# Patient Record
Sex: MALE | Race: BLACK OR AFRICAN AMERICAN | Employment: OTHER | URBAN - METROPOLITAN AREA
[De-identification: names, ages, dates, MRNs, and addresses within clinical notes are randomized per-mention and may not be internally consistent; named-entity substitution may affect disease eponyms.]

---

## 2023-04-23 ENCOUNTER — HOSPITAL ENCOUNTER (EMERGENCY)
Age: 73
Discharge: PSYCHIATRIC OR SUBSTANCE USE FACILITY | End: 2023-04-25
Attending: EMERGENCY MEDICINE
Payer: MEDICARE

## 2023-04-23 DIAGNOSIS — E87.6 HYPOKALEMIA: ICD-10-CM

## 2023-04-23 DIAGNOSIS — E86.0 MODERATE DEHYDRATION: ICD-10-CM

## 2023-04-23 DIAGNOSIS — F23 ACUTE PSYCHOSIS (HCC): Primary | ICD-10-CM

## 2023-04-23 LAB
ALBUMIN SERPL-MCNC: 3.7 G/DL (ref 3.5–5)
ALBUMIN/GLOB SERPL: 1 (ref 1.1–2.2)
ALP SERPL-CCNC: 84 U/L (ref 45–117)
ALT SERPL-CCNC: 26 U/L (ref 12–78)
AMPHET UR QL SCN: NEGATIVE
ANION GAP SERPL CALC-SCNC: 3 MMOL/L (ref 5–15)
ANION GAP SERPL CALC-SCNC: 6 MMOL/L (ref 5–15)
APPEARANCE UR: CLEAR
AST SERPL W P-5'-P-CCNC: 35 U/L (ref 15–37)
BACTERIA URNS QL MICRO: NEGATIVE /HPF
BARBITURATES UR QL SCN: NEGATIVE
BASOPHILS # BLD: 0.1 K/UL (ref 0–0.1)
BASOPHILS NFR BLD: 1 % (ref 0–1)
BENZODIAZ UR QL: NEGATIVE
BILIRUB SERPL-MCNC: 0.6 MG/DL (ref 0.2–1)
BILIRUB UR QL: NEGATIVE
BUN SERPL-MCNC: 34 MG/DL (ref 6–20)
BUN SERPL-MCNC: 43 MG/DL (ref 6–20)
BUN/CREAT SERPL: 24 (ref 12–20)
BUN/CREAT SERPL: 27 (ref 12–20)
CA-I BLD-MCNC: 8.9 MG/DL (ref 8.5–10.1)
CA-I BLD-MCNC: 9.5 MG/DL (ref 8.5–10.1)
CANNABINOIDS UR QL SCN: NEGATIVE
CHLORIDE SERPL-SCNC: 107 MMOL/L (ref 97–108)
CHLORIDE SERPL-SCNC: 112 MMOL/L (ref 97–108)
CK SERPL-CCNC: 670 U/L (ref 39–308)
CK SERPL-CCNC: 953 U/L (ref 39–308)
CO2 SERPL-SCNC: 25 MMOL/L (ref 21–32)
CO2 SERPL-SCNC: 28 MMOL/L (ref 21–32)
COCAINE UR QL SCN: NEGATIVE
COLOR UR: ABNORMAL
CREAT SERPL-MCNC: 1.27 MG/DL (ref 0.7–1.3)
CREAT SERPL-MCNC: 1.82 MG/DL (ref 0.7–1.3)
DIFFERENTIAL METHOD BLD: ABNORMAL
DRUG SCRN COMMENT,DRGCM: NORMAL
EOSINOPHIL # BLD: 0.1 K/UL (ref 0–0.4)
EOSINOPHIL NFR BLD: 2 % (ref 0–7)
ERYTHROCYTE [DISTWIDTH] IN BLOOD BY AUTOMATED COUNT: 11.8 % (ref 11.5–14.5)
ETHANOL SERPL-MCNC: <10 MG/DL
FLUAV RNA SPEC QL NAA+PROBE: NOT DETECTED
FLUBV RNA SPEC QL NAA+PROBE: NOT DETECTED
GLOBULIN SER CALC-MCNC: 3.6 G/DL (ref 2–4)
GLUCOSE SERPL-MCNC: 157 MG/DL (ref 65–100)
GLUCOSE SERPL-MCNC: 95 MG/DL (ref 65–100)
GLUCOSE UR STRIP.AUTO-MCNC: NEGATIVE MG/DL
HCT VFR BLD AUTO: 43.7 % (ref 36.6–50.3)
HGB BLD-MCNC: 14.5 G/DL (ref 12.1–17)
HGB UR QL STRIP: NEGATIVE
HYALINE CASTS URNS QL MICRO: ABNORMAL /LPF (ref 0–5)
IMM GRANULOCYTES # BLD AUTO: 0 K/UL (ref 0–0.04)
IMM GRANULOCYTES NFR BLD AUTO: 0 % (ref 0–0.5)
KETONES UR QL STRIP.AUTO: NEGATIVE MG/DL
LEUKOCYTE ESTERASE UR QL STRIP.AUTO: NEGATIVE
LYMPHOCYTES # BLD: 0.6 K/UL (ref 0.8–3.5)
LYMPHOCYTES NFR BLD: 8 % (ref 12–49)
MAGNESIUM SERPL-MCNC: 2.5 MG/DL (ref 1.6–2.4)
MCH RBC QN AUTO: 30 PG (ref 26–34)
MCHC RBC AUTO-ENTMCNC: 33.2 G/DL (ref 30–36.5)
MCV RBC AUTO: 90.5 FL (ref 80–99)
METHADONE UR QL: NEGATIVE
MONOCYTES # BLD: 0.3 K/UL (ref 0–1)
MONOCYTES NFR BLD: 4 % (ref 5–13)
MUCOUS THREADS URNS QL MICRO: ABNORMAL /LPF
NEUTS SEG # BLD: 6.3 K/UL (ref 1.8–8)
NEUTS SEG NFR BLD: 85 % (ref 32–75)
NITRITE UR QL STRIP.AUTO: NEGATIVE
NRBC # BLD: 0 K/UL (ref 0–0.01)
NRBC BLD-RTO: 0 PER 100 WBC
OPIATES UR QL: NEGATIVE
PCP UR QL: NEGATIVE
PH UR STRIP: 5 (ref 5–8)
PLATELET # BLD AUTO: 265 K/UL (ref 150–400)
PMV BLD AUTO: 9.5 FL (ref 8.9–12.9)
POTASSIUM SERPL-SCNC: 2.6 MMOL/L (ref 3.5–5.1)
POTASSIUM SERPL-SCNC: 3.9 MMOL/L (ref 3.5–5.1)
PROT SERPL-MCNC: 7.3 G/DL (ref 6.4–8.2)
PROT UR STRIP-MCNC: 30 MG/DL
RBC # BLD AUTO: 4.83 M/UL (ref 4.1–5.7)
RBC #/AREA URNS HPF: ABNORMAL /HPF (ref 0–5)
SARS-COV-2 RNA RESP QL NAA+PROBE: NOT DETECTED
SODIUM SERPL-SCNC: 138 MMOL/L (ref 136–145)
SODIUM SERPL-SCNC: 143 MMOL/L (ref 136–145)
SP GR UR REFRACTOMETRY: 1.02 (ref 1–1.03)
UA: UC IF INDICATED,UAUC: ABNORMAL
UROBILINOGEN UR QL STRIP.AUTO: 4 EU/DL (ref 0.1–1)
WBC # BLD AUTO: 7.4 K/UL (ref 4.1–11.1)
WBC URNS QL MICRO: ABNORMAL /HPF (ref 0–4)

## 2023-04-23 PROCEDURE — 87636 SARSCOV2 & INF A&B AMP PRB: CPT

## 2023-04-23 PROCEDURE — 83735 ASSAY OF MAGNESIUM: CPT

## 2023-04-23 PROCEDURE — 82077 ASSAY SPEC XCP UR&BREATH IA: CPT

## 2023-04-23 PROCEDURE — 85025 COMPLETE CBC W/AUTO DIFF WBC: CPT

## 2023-04-23 PROCEDURE — 80307 DRUG TEST PRSMV CHEM ANLYZR: CPT

## 2023-04-23 PROCEDURE — 80048 BASIC METABOLIC PNL TOTAL CA: CPT

## 2023-04-23 PROCEDURE — 96372 THER/PROPH/DIAG INJ SC/IM: CPT

## 2023-04-23 PROCEDURE — 99285 EMERGENCY DEPT VISIT HI MDM: CPT

## 2023-04-23 PROCEDURE — 81001 URINALYSIS AUTO W/SCOPE: CPT

## 2023-04-23 PROCEDURE — 74011250636 HC RX REV CODE- 250/636: Performed by: EMERGENCY MEDICINE

## 2023-04-23 PROCEDURE — 74011250637 HC RX REV CODE- 250/637: Performed by: EMERGENCY MEDICINE

## 2023-04-23 PROCEDURE — 36415 COLL VENOUS BLD VENIPUNCTURE: CPT

## 2023-04-23 PROCEDURE — 90791 PSYCH DIAGNOSTIC EVALUATION: CPT

## 2023-04-23 PROCEDURE — 82550 ASSAY OF CK (CPK): CPT

## 2023-04-23 PROCEDURE — 80053 COMPREHEN METABOLIC PANEL: CPT

## 2023-04-23 RX ORDER — POTASSIUM CHLORIDE 750 MG/1
40 TABLET, FILM COATED, EXTENDED RELEASE ORAL
Status: ACTIVE | OUTPATIENT
Start: 2023-04-23 | End: 2023-04-23

## 2023-04-23 RX ORDER — GUAIFENESIN 100 MG/5ML
200 SOLUTION ORAL
Status: COMPLETED | OUTPATIENT
Start: 2023-04-23 | End: 2023-04-23

## 2023-04-23 RX ORDER — SODIUM CHLORIDE 9 MG/ML
1000 INJECTION, SOLUTION INTRAVENOUS ONCE
Status: COMPLETED | OUTPATIENT
Start: 2023-04-23 | End: 2023-04-23

## 2023-04-23 RX ORDER — ZIPRASIDONE MESYLATE 20 MG/ML
20 INJECTION, POWDER, LYOPHILIZED, FOR SOLUTION INTRAMUSCULAR
Status: COMPLETED | OUTPATIENT
Start: 2023-04-23 | End: 2023-04-23

## 2023-04-23 RX ORDER — LORAZEPAM 2 MG/ML
2 INJECTION INTRAMUSCULAR
Status: COMPLETED | OUTPATIENT
Start: 2023-04-23 | End: 2023-04-23

## 2023-04-23 RX ORDER — SODIUM CHLORIDE AND POTASSIUM CHLORIDE 300; 900 MG/100ML; MG/100ML
INJECTION, SOLUTION INTRAVENOUS CONTINUOUS
Status: DISPENSED | OUTPATIENT
Start: 2023-04-23 | End: 2023-04-23

## 2023-04-23 RX ORDER — ACETAMINOPHEN 325 MG/1
650 TABLET ORAL
Status: COMPLETED | OUTPATIENT
Start: 2023-04-23 | End: 2023-04-23

## 2023-04-23 RX ADMIN — GUAIFENESIN 200 MG: 200 SOLUTION ORAL at 17:36

## 2023-04-23 RX ADMIN — ZIPRASIDONE MESYLATE 20 MG: 20 INJECTION, POWDER, LYOPHILIZED, FOR SOLUTION INTRAMUSCULAR at 21:49

## 2023-04-23 RX ADMIN — LORAZEPAM 2 MG: 2 INJECTION INTRAMUSCULAR; INTRAVENOUS at 00:45

## 2023-04-23 RX ADMIN — SODIUM CHLORIDE 1000 ML: 9 INJECTION, SOLUTION INTRAVENOUS at 03:19

## 2023-04-23 RX ADMIN — ACETAMINOPHEN 650 MG: 325 TABLET ORAL at 17:36

## 2023-04-23 RX ADMIN — SODIUM CHLORIDE 1000 ML: 9 INJECTION, SOLUTION INTRAVENOUS at 15:31

## 2023-04-23 RX ADMIN — POTASSIUM CHLORIDE AND SODIUM CHLORIDE: 900; 300 INJECTION, SOLUTION INTRAVENOUS at 03:47

## 2023-04-23 RX ADMIN — ZIPRASIDONE MESYLATE 20 MG: 20 INJECTION, POWDER, LYOPHILIZED, FOR SOLUTION INTRAMUSCULAR at 00:45

## 2023-04-24 ENCOUNTER — APPOINTMENT (OUTPATIENT)
Dept: GENERAL RADIOLOGY | Age: 73
End: 2023-04-24
Attending: EMERGENCY MEDICINE
Payer: MEDICARE

## 2023-04-24 LAB
CK SERPL-CCNC: 650 U/L (ref 39–308)
TROPONIN I SERPL HS-MCNC: 26 NG/L (ref 0–76)

## 2023-04-24 PROCEDURE — 74011250637 HC RX REV CODE- 250/637: Performed by: STUDENT IN AN ORGANIZED HEALTH CARE EDUCATION/TRAINING PROGRAM

## 2023-04-24 PROCEDURE — 93005 ELECTROCARDIOGRAM TRACING: CPT

## 2023-04-24 PROCEDURE — 74011250637 HC RX REV CODE- 250/637: Performed by: EMERGENCY MEDICINE

## 2023-04-24 PROCEDURE — 36415 COLL VENOUS BLD VENIPUNCTURE: CPT

## 2023-04-24 PROCEDURE — 82550 ASSAY OF CK (CPK): CPT

## 2023-04-24 PROCEDURE — 71045 X-RAY EXAM CHEST 1 VIEW: CPT

## 2023-04-24 PROCEDURE — 84484 ASSAY OF TROPONIN QUANT: CPT

## 2023-04-24 RX ORDER — IBUPROFEN 600 MG/1
600 TABLET ORAL
Status: COMPLETED | OUTPATIENT
Start: 2023-04-24 | End: 2023-04-24

## 2023-04-24 RX ORDER — ACETAMINOPHEN 500 MG
500 TABLET ORAL ONCE
Status: COMPLETED | OUTPATIENT
Start: 2023-04-24 | End: 2023-04-24

## 2023-04-24 RX ADMIN — IBUPROFEN 600 MG: 600 TABLET, FILM COATED ORAL at 17:32

## 2023-04-24 RX ADMIN — ACETAMINOPHEN 500 MG: 500 TABLET ORAL at 06:23

## 2023-04-24 NOTE — BSMART NOTE
BSMART LIAISON NOTE:      Aaliyah Maldonado, provided most recent update:      Packets have been sent to [address] [place] [address] for review. He is on the wait list for Located within [facility]. Declined by Carl Barry for \"no appropriate bed\".

## 2023-04-24 NOTE — BSMART NOTE
BSMART Liaison Team Note     LOS:  34:34     Patient goal(s) for today: \"I would like to leave. I spent enough time making people think that I am crazy\". BSMART Liaison team focus/goals: to provide support, brief therapy, education and recommendations. Progress note: This writer rounded on patient. Patient agreed to talk with this writer and was informed of counselor's role. The patient's appearance is disheveled. The patient's behavior shows no evidence of impairment. The patient is oriented to time, place, person and situation. The patient's speech shows no evidence of impairment. The patient's mood  is euthymic. The range of affect shows no evidence of impairment. The patient's thought content  demonstrates grandiosity. The thought process shows a flight of ideas. The patient's perception shows no evidence of impairment. The patient's memory shows no evidence of impairment. The patient's appetite shows no evidence of impairment. The patient's sleep has evidence of insomnia. Judgement and insight are limited. Patient denied suicidal and/or homicidal ideation. Pt presented as \"happy and jolly\" this morning and positive that he will be able to leave after his hearing. He stated that he was tired of \"making people think that he is crazy\". He discussed that he was trying to get people's attention the night that he was brought here. He appeared somewhat delusional with his statements about reasons for his admission to the ED. Pt indicated that he is planning on leaving after his hearing because he has \"things to do\". He reported that his goal is to see his two children in person again before he dies. He denied suicidal ideation. Barriers to Discharge: TDO - Hearing today  Guns in the home: no     Outpatient provider(s):  unknown  Insurance info/prescription coverage:  not on file    Diagnosis: Acute Psychosis     Plan:  Hearing today.    Follow up Psych Consult placed? no.   Psychiatrist [facility] GDM Care Plan      Assessment/Plan: Spoke with [facility] via professional  via telephone.  She is currently taking Lantus 18 units in the morning and 22 units at bedtime.  Blood Sugar Readings:  6/13  F-76  B-  L-104  D-    6/14  F-84  B-  L-153  D-112    6/15  F-111  B-  L-269  D-161    6.16  F-102  B-  L-156  D-220    6.17  F-114  B-  L-168  D-110    6.18  F-105  B-  L-196  D-  She stated she had the same thing for dinner last night as the night before.  She saw her MD on the 12th and they talked about using insulin before meals.  Discussed starting insulin and she is ready today.  Started her on 4 units of Humalog before lunch and dinner.  Reviewed  dosing, timing of insulin and hypoglycemia signs and treatment.   She will follow-up next week.  Swelling-no  Baby moving-yes  Concerns-just blood sugars    Visit Type: pregnancy clinic   Provider: Bert  Provider's Diagnosis (per referral form): Gestational (648.83)    Weight:    Lab Results   Component Value Date    HGBA1C 7.4 (H) 03/25/2020       Estimated Date of Delivery: 10/14/20   Pregnancy #: 6  Previous GDM: Yes   Medications:   Current Outpatient Medications:      acetaminophen (TYLENOL) 325 MG tablet, Take 2 tablets (650 mg total) by mouth every 6 (six) hours as needed for pain., Disp: 100 tablet, Rfl: 1     aspirin 81 MG EC tablet, Take 1 tablet (81 mg total) by mouth daily., Disp: 90 tablet, Rfl: 2     blood glucose test (CONTOUR TEST STRIPS) strips, Use 1 each As Directed 4 (four) times a day. Dispense brand per patient's insurance at pharmacy discretion., Disp: 100 strip, Rfl: 3     calcium, as carbonate, (TUMS) 200 mg calcium (500 mg) chewable tablet, Chew 1 tablet (200 mg total) daily., Disp: 100 tablet, Rfl: 3     generic lancets (FINGERSTIX LANCETS), Use 1 each As Directed 4 (four) times a day. Dispense brand per patient's insurance, Disp: 100 each, Rfl: 3     insulin glargine (LANTUS SOLOSTAR U-100 INSULIN) 100 unit/mL (3 mL) pen, Take  "twice daily as directed, up to 60 units a day, Disp: 5 adj dose pen, Rfl: 2     insulin lispro (HUMALOG KWIKPEN INSULIN) 100 unit/mL pen, Take 10 units before each meal every day.  Increase as directed.  Max daily dose 50 units., Disp: 5 adj dose pen, Rfl: 2     metFORMIN (GLUCOPHAGE) 1000 MG tablet, TAKE 1 TABLET (1,000 MG TOTAL) BY MOUTH 2 (TWO) TIMES A DAY WITH MEALS FOR DIABETES, Disp: 180 tablet, Rfl: 3     omeprazole (PRILOSEC) 20 MG capsule, Take 1 capsule (20 mg total) by mouth daily before breakfast., Disp: 30 capsule, Rfl: 11     pen needle, diabetic 32 gauge x 5/32\" Ndle, Use 1 each As Directed 3 (three) times a day., Disp: 100 each, Rfl: 1     prenatal vit-iron fum-folic ac (PRENATAL VITAMIN) 27 mg iron- 0.8 mg Tab tablet, Take 1 tablet by mouth daily., Disp: 100 tablet, Rfl: 3      BG monitoring goals: Fasting <95; 1 hour post start of meal <140. Test 4 x per day.  Check fasting a.m. ketones: No  GDM meal pattern/carb counting taught per guidelines: Yes    Past Goals:  Nutrition: GDM meal plan -Met  Activity: Walking after meals when able/staying active -Met  Monitoring: BG 4x/day as directed, ketones every morning -Met      New Goals:  Nutrition: GDM meal plan   Activity: Walking after meals when able/staying active   Monitoring: BG 4x/day as directed, ketones every morning    DIABETES CARE PLAN AND EDUCATION RECORD    Gestational Diabetes Disease Process/Preconception Care/Management During Pregnancy/Postpartum:Discussed    Meter (per above goals): Assessed and Discussed    Nutrition Management    Weight: Assessed and Discussed  Portions/Balance: Assessed and Discussed  Carb ID/Count: Assessed and Discussed  Label Reading: Assessed and Discussed  Menu Planning: Assessed and Discussed  Dining Out: Assessed and Discussed  Physical Activity: Assessed and Discussed    Acute Complications: Prevent, Detect, Treat:    Goal Setting and Problem Solving: Assessed and Discussed  Barriers: Assessed and " updated? no       Participating treatment team members: Rajat Arzola., Altagracia Macario, [address] [facility], [address] [place] [address] Discussed  Psychosocial Adjustments: Assessed and Discussed    I agree with the aforementioned diabetes plan.  Betsy OBRIEN [facility] Endocrinology  6/24/2020  3:43 PM

## 2023-04-24 NOTE — ED NOTES
Pt seen standing in corner of room urinating, this was addressed by 1:1 and pt became agitated and yelling at 1:1 about interrupting him. Writer attempted to de-escalate pt which further escalated pt's behavior to yelling at [address] as well and wanting staff to come into his room to talk privately. Pt was redirected to hsi room by [facility] enforcing his TDO and pt is handcuffed to bed. V/o from ED provider for medication, administered without complication.

## 2023-04-24 NOTE — BSMART NOTE
BSMART LIAISON NOTE:     Zane to round on pt for re-consideration to the unit. Garrick to come down and re-assess for appropriateness.

## 2023-04-24 NOTE — ED NOTES
Spoke to D19 who stated that pt is being evaluated for admission at [place] but that they are requesting updated vital signs, copy of MAR, chest x-ray, H&P, EKG, troponin, and a repeat CK. All requested items have been ordered and will be obtained and sent as requested.

## 2023-04-24 NOTE — ED NOTES
Unable to assess CSSRS frequent screener due to pt condition. Pt was given Geodon at 2149 and is currently asleep.

## 2023-04-24 NOTE — CONSULTS
Consult Date: 4/24/2023    IP CONSULT TO PSYCHIATRY  Consult performed by: Kaylah Michel MD  Consult ordered by: Nydia Lyle DO    Reason for psychiatric consult-TDO, psychosis    History of presenting illness-patient Mr. Cathy Mendes 18year-old who is currently under TDO and presented with psychosis. As per review of charts patient had showed up at [address] flashing car lights and leaving police officers down. It is noted that the officers have tried to calm him down but it was unsuccessful and he had to [address] to the hospital.  It is reported that patient was behaving erratically and saying Annamae Memos [place]\" and that the police want to kill him. Patient has had expressed \"it bothers him that Claudia Latin came back and you Fools don't even know it and are trying to poison me\". Patient has presented repetitive with hyperreligious statements and noted disorganized. He has continuously repeated that Claudia Latin will be coming soon and he wants to let everybody know. Patient seen this morning who states he was trying to get the attention of the police. He does not know who brought him to the hospital.  He presents labile. He states that this writer was safe in his room and that I should not be afraid. Denies having any suicidal feelings. He still presents with Oriental orthodox preoccupation. He was unable to relate to any family members whom he keeps in touch currently. He states he lives by himself. Mental status examination-patient is alert oriented to name. He does not remember how he came to the hospital.  Jain preoccupation. Mood he does not remember who his support system are. No active suicidal or homicidal feelings reported. Internally preoccupied. Insight judgment coping remains impaired    Subjective     No past medical history on file. No past surgical history on file. No family history on file.    Social History     Tobacco Use    Smoking status: Not on file    Smokeless tobacco: Not on file   Substance Use Topics    Alcohol use: Not on file            Review of Systems    Objective     Vital signs for last 24 hours:  Visit Vitals  BP (!) 156/91 (BP 1 Location: Right upper arm, BP Patient Position: Reclining)   Pulse 81   Temp 97.8 °F (36.6 °C)   Resp 16   Ht 6' 2\" (1.88 m)   Wt 113.4 kg (250 lb)   SpO2 98%   BMI 32.10 kg/m²       Intake/Output this shift:  Current Shift: No intake/output data recorded.   Last 3 Shifts: 04/22 1901 - 04/24 0700  In: 2000 [I.V.:2000]  Out: 400 [Urine:400]    Data Review:   Recent Results (from the past 24 hour(s))   CK    Collection Time: 04/23/23  2:27 PM   Result Value Ref Range     (H) 39 - 907 U/L   METABOLIC PANEL, BASIC    Collection Time: 04/23/23  2:27 PM   Result Value Ref Range    Sodium 143 136 - 145 mmol/L    Potassium 3.9 3.5 - 5.1 mmol/L    Chloride 112 (H) 97 - 108 mmol/L    CO2 28 21 - 32 mmol/L    Anion gap 3 (L) 5 - 15 mmol/L    Glucose 95 65 - 100 mg/dL    BUN 34 (H) 6 - 20 mg/dL    Creatinine 1.27 0.70 - 1.30 mg/dL    BUN/Creatinine ratio 27 (H) 12 - 20      eGFR >60 >60 ml/min/1.73m2    Calcium 8.9 8.5 - 10.1 mg/dL       Assessment/plan-  Psychosis not otherwise specified    Patient meets criteria for inpatient psychiatric hospitalization and further stabilization with medications and to reassess safety    TDO hearing today

## 2023-04-24 NOTE — BSMART NOTE
TDO Disposition     : Concetta Katz   : Mrs. Ruvalcaba Degree: 7 Days to a CSB Approved Facility/[facility] 2 [address]   Expires: May 4, 2023

## 2023-04-25 ENCOUNTER — HOSPITAL ENCOUNTER (EMERGENCY)
Age: 73
Discharge: HOME | End: 2023-04-25

## 2023-04-25 VITALS
OXYGEN SATURATION: 98 % | SYSTOLIC BLOOD PRESSURE: 143 MMHG | WEIGHT: 250 LBS | HEIGHT: 74 IN | TEMPERATURE: 98 F | RESPIRATION RATE: 16 BRPM | BODY MASS INDEX: 32.08 KG/M2 | DIASTOLIC BLOOD PRESSURE: 93 MMHG | HEART RATE: 84 BPM

## 2023-04-25 LAB
ATRIAL RATE: 77 BPM
ATRIAL RATE: 88 BPM
CALCULATED P AXIS, ECG09: 31 DEGREES
CALCULATED P AXIS, ECG09: 40 DEGREES
CALCULATED R AXIS, ECG10: -12 DEGREES
CALCULATED R AXIS, ECG10: 36 DEGREES
CALCULATED T AXIS, ECG11: 53 DEGREES
CALCULATED T AXIS, ECG11: 79 DEGREES
CK SERPL-CCNC: 473 U/L (ref 39–308)
DIAGNOSIS, 93000: NORMAL
DIAGNOSIS, 93000: NORMAL
P-R INTERVAL, ECG05: 144 MS
P-R INTERVAL, ECG05: 222 MS
Q-T INTERVAL, ECG07: 336 MS
Q-T INTERVAL, ECG07: 390 MS
QRS DURATION, ECG06: 102 MS
QRS DURATION, ECG06: 86 MS
QTC CALCULATION (BEZET), ECG08: 406 MS
QTC CALCULATION (BEZET), ECG08: 441 MS
VENTRICULAR RATE, ECG03: 77 BPM
VENTRICULAR RATE, ECG03: 88 BPM

## 2023-04-25 PROCEDURE — 74011250636 HC RX REV CODE- 250/636: Performed by: EMERGENCY MEDICINE

## 2023-04-25 PROCEDURE — 74011250637 HC RX REV CODE- 250/637: Performed by: EMERGENCY MEDICINE

## 2023-04-25 PROCEDURE — 74011000250 HC RX REV CODE- 250: Performed by: EMERGENCY MEDICINE

## 2023-04-25 PROCEDURE — 93005 ELECTROCARDIOGRAM TRACING: CPT

## 2023-04-25 PROCEDURE — 82550 ASSAY OF CK (CPK): CPT

## 2023-04-25 PROCEDURE — 36415 COLL VENOUS BLD VENIPUNCTURE: CPT

## 2023-04-25 RX ORDER — AMLODIPINE BESYLATE 5 MG/1
5 TABLET ORAL
Status: COMPLETED | OUTPATIENT
Start: 2023-04-25 | End: 2023-04-25

## 2023-04-25 RX ORDER — HYDRALAZINE HYDROCHLORIDE 25 MG/1
25 TABLET, FILM COATED ORAL ONCE
Status: COMPLETED | OUTPATIENT
Start: 2023-04-25 | End: 2023-04-25

## 2023-04-25 RX ORDER — ACETAMINOPHEN 500 MG
500 TABLET ORAL
Status: DISCONTINUED | OUTPATIENT
Start: 2023-04-25 | End: 2023-04-26 | Stop reason: HOSPADM

## 2023-04-25 RX ADMIN — ZIPRASIDONE MESYLATE 20 MG: 20 INJECTION, POWDER, LYOPHILIZED, FOR SOLUTION INTRAMUSCULAR at 17:54

## 2023-04-25 RX ADMIN — ACETAMINOPHEN 500 MG: 500 TABLET ORAL at 00:58

## 2023-04-25 RX ADMIN — ACETAMINOPHEN 500 MG: 500 TABLET ORAL at 15:30

## 2023-04-25 RX ADMIN — HYDRALAZINE HYDROCHLORIDE 25 MG: 25 TABLET, FILM COATED ORAL at 06:11

## 2023-04-25 RX ADMIN — AMLODIPINE BESYLATE 5 MG: 5 TABLET ORAL at 06:11

## 2023-04-25 NOTE — BSMART NOTE
ALAN Pavon 19, Pt accepted to     [facility]  Dr. Ortiz Katarzyna  Admission Unit  [phone] Nurse to Nurse  605 Rosalia Mcgovern RN ED notified    [facility] will  to transport

## 2023-04-25 NOTE — ED NOTES
4/24/2023    EKG Interpretation of April 24, 2023 at 1447 sinus rhythm rate of 77 atraumatic operative AV block no ST elevation or ST depression. No T wave inversions.   Overall sinus rhythm with first-degree block no acute ischemic changes    Suresh Michel, DO

## 2023-04-25 NOTE — ED NOTES
Spoke with Natalia at [facility], informed her of pts current status and answered all questions.

## 2023-04-25 NOTE — ED NOTES
Pt out of room to go to bathroom. Pt very talkative pt states he wanted to spar with the nurse but he prayed on it and was told it was not a good idea. Pt has asked staff member what was their age, where did they live, and also the sitter needs to move her chair because pt said he could see everything she has going on and would like to put the sitter in a spinning chair.

## 2023-04-25 NOTE — ED NOTES
Report called to Nurse Brain Coca at [address] [facility] requesting to be contacted when police arrive to transport patient.

## 2023-04-25 NOTE — CONSULTS
CARDIOLOGY CONSULTATION    REASON FOR CONSULT: Cardiac risk assessment prior to placement    REQUESTING PROVIDER: Dr. Kristin Blanton:  Psychosis    HISTORY OF PRESENT ILLNESS:  Amarilys Noonan is a 81y.o. year-old male with past medical history significant for psych issues who was evaluated today due to cardiac risk assessment prior to placement. He was brought in by police due to erratic behavior. He has been evaluated by psychiatry and recommended TDO for treatment. He denies chest pain or shortness of breath. Never has to stop walking due to shortness of breath or chest pain. No palpitations or dizziness. He denies syncope. He denies any cardiac history. ECG shows SR with PACs. Records from hospital admission course thus far reviewed. INPATIENT MEDICATIONS:  Home medications reviewed. Current Facility-Administered Medications:     acetaminophen (TYLENOL) tablet 500 mg, 500 mg, Oral, Q6H PRN, Yo Erwin K, DO, 500 mg at 04/25/23 0058  No current outpatient medications on file. ALLERGIES:  Allergies reviewed with the patient, No Known Allergies . FAMILY HISTORY:  Family history reviewed. SOCIAL HISTORY:  Notable for former tobacco use, no heavy alcohol or illicit drug use. REVIEW OF SYSTEMS:  Complete review of systems performed, pertinents noted above, all other systems are negative. PHYSICAL EXAMINATION:    General:  Alert, in NAD  Cardiovascular:  RRR, no murmur  Respiratory:  Lungs are clear  Abdomen:  Soft, nontender  Extremities:  No lower extremity edema  Skin:  Dry, warm    Visit Vitals  BP (!) 155/89 (BP 1 Location: Left upper arm)   Pulse (!) 107   Temp 98.3 °F (36.8 °C)   Resp 16   Ht 6' 2\" (1.88 m)   Wt 113.4 kg (250 lb)   SpO2 96%   BMI 32.10 kg/m²       Recent labs results and imaging reviewed.     Discussed case with Dr. Fortino Herr and our impression and recommendations are as follows:  Cardiac risk assessment prior to placement, ECG nonischemic  No chest pain  Trop negative  CK trending down  Denies cardiac symptoms, good functional capacity, ECG nonischemic, acceptable to proceed with planned placement  Elevated blood pressure, continue to trend BP, low salt diet  Psychosis, per psychiatry    Thank you for involving us in the care of this patient. Please do not hesitate to call me or Dr. Augusto Ayala if additional questions arise.     Yue Gaitan NP  4/25/2023

## 2023-04-25 NOTE — BSMART NOTE
Writer spoke with ALAN Santiago, re to [place] status. She states that \"they have everything that they need and will be reviewing him this am\". Awaiting return call from ALAN Johnston.

## 2023-04-25 NOTE — ED NOTES
Verbal shift change report given to Brett Del Rio RN (oncoming nurse) by Janelle Ramos RN (offgoing nurse). Report included the following information SBAR, ED Summary, MAR and Recent Results. Constant observer with Pt at this time. Pt in green gown.

## 2023-04-25 NOTE — ED NOTES
4/25/2023  3:04 AM    Care handed off to me at shift change. Patient rounded on, patient much more coherent at this time. He is in the ED with psychosis awaiting psych disposition. No changes, medically cleared.     Jessica Yaens, DO

## 2023-04-25 NOTE — BSMART NOTE
Pt reassessed face to face in ED 24. Pt calm polite. Denies SI HI Hallucinations at this time. Will continue to monitor.

## 2023-04-25 NOTE — ED NOTES
Verbal shift change report given to joaquin (oncoming nurse) by Rachel Blunt (offgoing nurse). Report included the following information SBAR, ED Summary, MAR, and Recent Results.

## 2023-04-26 LAB
ATRIAL RATE: 91 BPM
CALCULATED P AXIS, ECG09: 73 DEGREES
CALCULATED R AXIS, ECG10: 2 DEGREES
CALCULATED T AXIS, ECG11: 76 DEGREES
DIAGNOSIS, 93000: NORMAL
P-R INTERVAL, ECG05: 202 MS
Q-T INTERVAL, ECG07: 356 MS
QRS DURATION, ECG06: 100 MS
QTC CALCULATION (BEZET), ECG08: 437 MS
VENTRICULAR RATE, ECG03: 91 BPM